# Patient Record
(demographics unavailable — no encounter records)

---

## 2024-11-27 NOTE — DISCUSSION/SUMMARY
[FreeTextEntry1] : This is an 93 year old man with a  prior history of hypertension who presents to the office for follow up cardiac evaluation.   He presented to [facility] in December of 2020 with increased dyspnea at rest and with exertion, and increased LE edema.  He had an echocardiogram that showed severe LV dysfunction with an EF of 25-30%.  He was noted to have nonobstructive CAD on coronary angiography.   His weight is stable, and he is euvolemic.   He does have mild SOB with getting dressed, but his lungs are clear and he has no JVD.  He is taking Lasix 20 QD as he was urinating too much.  He only has LE edema when on his feet too long.  If he gets edema or more sob he will call us.  He will continue eplerenone 25 QD.   We reduced carvedilol to 6.25 bid for bradycardia, and his HR is improved.   He will continue lisinopril 40 QD.   He will continue Farxiga 5 QD.   He will continue to weigh himself daily.   He will follow in 4 months.  He knows to call the office with any issues.  Last blood work was unrevealing.  [EKG obtained to assist in diagnosis and management of assessed problem(s)] : EKG obtained to assist in diagnosis and management of assessed problem(s)

## 2024-11-27 NOTE — PHYSICAL EXAM
[Normal Appearance] : normal appearance [General Appearance - In No Acute Distress] : no acute distress [Normal Conjunctiva] : the conjunctiva exhibited no abnormalities [Normal Oral Mucosa] : normal oral mucosa [Normal Jugular Venous V Waves Present] : normal jugular venous V waves present [Respiration, Rhythm And Depth] : normal respiratory rhythm and effort [Exaggerated Use Of Accessory Muscles For Inspiration] : no accessory muscle use [Auscultation Breath Sounds / Voice Sounds] : lungs were clear to auscultation bilaterally [FreeTextEntry1] : bibasilar rales [Bowel Sounds] : normal bowel sounds [Abdomen Soft] : soft [Abdomen Tenderness] : non-tender [Abnormal Walk] : normal gait [Nail Clubbing] : no clubbing of the fingernails [Cyanosis, Localized] : no localized cyanosis [Petechial Hemorrhages (___cm)] : no petechial hemorrhages [Skin Color & Pigmentation] : normal skin color and pigmentation [] : no rash [No Venous Stasis] : no venous stasis [Skin Lesions] : no skin lesions [Oriented To Time, Place, And Person] : oriented to person, place, and time [Affect] : the affect was normal [Mood] : the mood was normal [No Anxiety] : not feeling anxious [Not Palpable] : not palpable [Normal Rate] : normal [Premature Beats] : regular with premature beats [Normal S1] : normal S1 [Normal S2] : normal S2 [No Murmur] : no murmurs heard [Right Carotid Bruit] : no bruit heard over the right carotid [Left Carotid Bruit] : no bruit heard over the left carotid [1+] : left 1+ [No Abnormalities] : the abdominal aorta was not enlarged and no bruit was heard [No Pitting Edema] : no pitting edema present

## 2024-11-27 NOTE — REASON FOR VISIT
[Follow-Up - Clinic] : a clinic follow-up of [Cardiomyopathy] : cardiomyopathy [Coronary Artery Disease] : coronary artery disease [Hypertension] : hypertension [Medication Management] : Medication management

## 2024-11-27 NOTE — HISTORY OF PRESENT ILLNESS
[FreeTextEntry1] : This is an 93 year old man with a  prior history of hypertension who presents to the office for follow up cardiac evaluation.  He presented to  in December of 2020 with increased dyspnea at rest and with exertion, and increased LE edema.  He had an echocardiogram that showed severe LV dysfunction with an EF of 25-30%.  He was diuresed, started on secondary prevention medications, and sent to  for coronary angiography.  He was noted to have nonobstructive CAD, and discharged with follow up.  HIs last echocardiogram showed moderate LV dysfunction.  His weight has been stable since his last visit.  His is taking Lasix 20 QD.   His LE edema is mildly increased.  He has mildly increased dyspnea today.  He has not gained any weight.    He is no longer short of breath when lying flat.  He is otherwise taking his medications as prescribed.    He denies chest pain, dizziness, or syncope.  His weight has been stable.  His blood pressure is controlled at outside office visits.

## 2024-11-27 NOTE — CARDIOLOGY SUMMARY
[de-identified] : MAXIMINO WILKINS [___] : [unfilled] [Severe] : severe LV dysfunction [Enlarged] : enlarged LA size [Mild] : mild mitral regurgitation

## 2025-03-28 NOTE — HISTORY OF PRESENT ILLNESS
[FreeTextEntry1] : This is an 93 year old man with a  prior history of hypertension who presents to the office for follow up cardiac evaluation.  He presented to  in December of 2020 with increased dyspnea at rest and with exertion, and increased LE edema.  He had an echocardiogram that showed severe LV dysfunction with an EF of 25-30%.  He was diuresed, started on secondary prevention medications, and sent to  for coronary angiography.  He was noted to have nonobstructive CAD, and discharged with follow up.  HIs last echocardiogram showed moderate LV dysfunction.  His weight has been stable since his last visit.  His is taking Lasix 20 QD.   His LE edema is  increased.  It is worse at the end of the day, and better in the AM.  He reports that his degree of HO is at baseline.   He is otherwise taking his medications as prescribed.    He denies chest pain, dizziness, or syncope.  His weight has been stable.  His blood pressure is controlled.

## 2025-03-28 NOTE — CARDIOLOGY SUMMARY
[de-identified] : NSR.  LAFB.  Poor R wave progression [___] : [unfilled] [Severe] : severe LV dysfunction [Enlarged] : enlarged LA size [Mild] : mild mitral regurgitation

## 2025-03-28 NOTE — DISCUSSION/SUMMARY
[FreeTextEntry1] : This is an 93 year old man with a  prior history of hypertension who presents to the office for follow up cardiac evaluation.   He presented to [facility] in December of 2020 with increased dyspnea at rest and with exertion, and increased LE edema.  He had an echocardiogram that showed severe LV dysfunction with an EF of 25-30%.  He was noted to have nonobstructive CAD on coronary angiography.   His weight is stable, but he is having increased LE edema.  I am increasing Lasix to 40 QD.  He is going to try to elevate, and I mentioned compression stockings to him.  I am repeating an echocardiogram.  He will continue eplerenone 25 QD.   We reduced carvedilol to 6.25 bid for bradycardia, and his HR is improved.   He will continue lisinopril 40 QD.   He will continue Farxiga 5 QD.   He will continue to weigh himself daily.   He will follow in 1-2 months.  He knows to call the office with any issues.  [EKG obtained to assist in diagnosis and management of assessed problem(s)] : EKG obtained to assist in diagnosis and management of assessed problem(s)

## 2025-03-28 NOTE — PHYSICAL EXAM
[Normal Appearance] : normal appearance [General Appearance - In No Acute Distress] : no acute distress [Normal Conjunctiva] : the conjunctiva exhibited no abnormalities [Normal Oral Mucosa] : normal oral mucosa [Normal Jugular Venous V Waves Present] : normal jugular venous V waves present [Respiration, Rhythm And Depth] : normal respiratory rhythm and effort [Exaggerated Use Of Accessory Muscles For Inspiration] : no accessory muscle use [Auscultation Breath Sounds / Voice Sounds] : lungs were clear to auscultation bilaterally [FreeTextEntry1] : bibasilar rales [Bowel Sounds] : normal bowel sounds [Abdomen Soft] : soft [Abdomen Tenderness] : non-tender [Abnormal Walk] : normal gait [Nail Clubbing] : no clubbing of the fingernails [Cyanosis, Localized] : no localized cyanosis [Petechial Hemorrhages (___cm)] : no petechial hemorrhages [Skin Color & Pigmentation] : normal skin color and pigmentation [] : no rash [No Venous Stasis] : no venous stasis [Skin Lesions] : no skin lesions [Oriented To Time, Place, And Person] : oriented to person, place, and time [Affect] : the affect was normal [Mood] : the mood was normal [No Anxiety] : not feeling anxious [Not Palpable] : not palpable [Normal Rate] : normal [Premature Beats] : regular with premature beats [Normal S1] : normal S1 [Normal S2] : normal S2 [No Murmur] : no murmurs heard [Right Carotid Bruit] : no bruit heard over the right carotid [Left Carotid Bruit] : no bruit heard over the left carotid [1+] : left 1+ [No Abnormalities] : the abdominal aorta was not enlarged and no bruit was heard [___ +] : bilateral [unfilled]U+ pretibial pitting edema

## 2025-05-15 NOTE — CARDIOLOGY SUMMARY
[de-identified] : NSR.  LAFB.  Poor R wave progression [___] : [unfilled] [Severe] : severe LV dysfunction [Enlarged] : enlarged LA size [Mild] : mild mitral regurgitation

## 2025-05-15 NOTE — HISTORY OF PRESENT ILLNESS
[FreeTextEntry1] : This is an 93 year old man with a  prior history of hypertension who presents to the office for follow up cardiac evaluation.  He presented to  in December of 2020 with increased dyspnea at rest and with exertion, and increased LE edema.  He had an echocardiogram that showed severe LV dysfunction with an EF of 25-30%.  He was diuresed, started on secondary prevention medications, and sent to  for coronary angiography.  He was noted to have nonobstructive CAD, and discharged with follow up.  HIs last echocardiogram showed moderate LV dysfunction.  His weight has been stable since his last visit.  He had worsening LE edema.  I sent him for an echocardiogram, and he has moderate LV dysfunction, severe LAE, and a moderately dilated aortic root.  I increased Lasix to 40 AM and 20 PM.  His LE edema is better.  His weight is down 8 pounds.   He reports that his degree of HO is at baseline.   He is otherwise taking his medications as prescribed.    He denies chest pain, dizziness, or syncope.   BP is controlled.

## 2025-05-15 NOTE — DISCUSSION/SUMMARY
[FreeTextEntry1] : This is an 93 year old man with a  prior history of hypertension who presents to the office for follow up cardiac evaluation.   He presented to [facility] in December of 2020 with increased dyspnea at rest and with exertion, and increased LE edema.  He had an echocardiogram that showed severe LV dysfunction with an EF of 25-30%.  He was noted to have nonobstructive CAD on coronary angiography.   I sent him for an echocardiogram, and he has moderate LV dysfunction, severe LAE, and a moderately dilated aortic root.  I increased Lasix to 40 AM and 20 PM.  His LE edema is better.  His weight is down 8 pounds.  He will continue Lasix 40 AM and 20 PM.  I am checking a basic panel today.   He will continue eplerenone 25 QD.   We reduced carvedilol to 6.25 bid for bradycardia, and his HR is improved.   He will continue lisinopril 40 QD.   He will continue Farxiga 5 QD.   He will continue to weigh himself daily.   He will follow in 1-2 months.  He knows to call the office with any issues.  [EKG obtained to assist in diagnosis and management of assessed problem(s)] : EKG obtained to assist in diagnosis and management of assessed problem(s)